# Patient Record
Sex: MALE | Race: WHITE | NOT HISPANIC OR LATINO
[De-identification: names, ages, dates, MRNs, and addresses within clinical notes are randomized per-mention and may not be internally consistent; named-entity substitution may affect disease eponyms.]

---

## 2021-08-03 ENCOUNTER — APPOINTMENT (OUTPATIENT)
Dept: OPHTHALMOLOGY | Facility: CLINIC | Age: 66
End: 2021-08-03
Payer: MEDICARE

## 2021-08-03 ENCOUNTER — NON-APPOINTMENT (OUTPATIENT)
Age: 66
End: 2021-08-03

## 2021-08-03 PROCEDURE — 92014 COMPRE OPH EXAM EST PT 1/>: CPT

## 2021-09-14 ENCOUNTER — FORM ENCOUNTER (OUTPATIENT)
Age: 66
End: 2021-09-14

## 2021-09-15 ENCOUNTER — TRANSCRIPTION ENCOUNTER (OUTPATIENT)
Age: 66
End: 2021-09-15

## 2022-07-15 ENCOUNTER — NON-APPOINTMENT (OUTPATIENT)
Age: 67
End: 2022-07-15

## 2022-07-20 ENCOUNTER — APPOINTMENT (OUTPATIENT)
Dept: OPHTHALMOLOGY | Facility: CLINIC | Age: 67
End: 2022-07-20

## 2022-07-20 ENCOUNTER — NON-APPOINTMENT (OUTPATIENT)
Age: 67
End: 2022-07-20

## 2022-07-20 PROCEDURE — 92014 COMPRE OPH EXAM EST PT 1/>: CPT

## 2023-07-06 PROBLEM — Z00.00 ENCOUNTER FOR PREVENTIVE HEALTH EXAMINATION: Status: ACTIVE | Noted: 2023-07-06

## 2023-07-21 ENCOUNTER — APPOINTMENT (OUTPATIENT)
Dept: OPHTHALMOLOGY | Facility: CLINIC | Age: 68
End: 2023-07-21
Payer: MEDICARE

## 2023-07-21 ENCOUNTER — NON-APPOINTMENT (OUTPATIENT)
Age: 68
End: 2023-07-21

## 2023-07-21 PROCEDURE — 92014 COMPRE OPH EXAM EST PT 1/>: CPT

## 2023-07-24 ENCOUNTER — APPOINTMENT (OUTPATIENT)
Dept: ORTHOPEDIC SURGERY | Facility: CLINIC | Age: 68
End: 2023-07-24
Payer: MEDICARE

## 2023-07-24 VITALS — WEIGHT: 161 LBS | HEIGHT: 66 IN | BODY MASS INDEX: 25.88 KG/M2

## 2023-07-24 DIAGNOSIS — M48.02 SPINAL STENOSIS, CERVICAL REGION: ICD-10-CM

## 2023-07-24 DIAGNOSIS — E78.00 PURE HYPERCHOLESTEROLEMIA, UNSPECIFIED: ICD-10-CM

## 2023-07-24 DIAGNOSIS — Z78.9 OTHER SPECIFIED HEALTH STATUS: ICD-10-CM

## 2023-07-24 DIAGNOSIS — M47.812 SPONDYLOSIS W/OUT MYELOPATHY OR RADICULOPATHY, CERVICAL REGION: ICD-10-CM

## 2023-07-24 PROCEDURE — 99204 OFFICE O/P NEW MOD 45 MIN: CPT

## 2023-07-24 RX ORDER — FAMOTIDINE 10 MG/1
TABLET, FILM COATED ORAL
Refills: 0 | Status: ACTIVE | COMMUNITY

## 2023-07-24 RX ORDER — ATORVASTATIN CALCIUM 20 MG/1
20 TABLET, FILM COATED ORAL
Refills: 0 | Status: ACTIVE | COMMUNITY

## 2023-07-27 PROBLEM — M47.812 CERVICAL SPONDYLOSIS: Status: ACTIVE | Noted: 2023-07-27

## 2023-07-27 PROBLEM — M48.02 STENOSIS, CERVICAL SPINE: Status: ACTIVE | Noted: 2023-07-27

## 2023-07-27 NOTE — HISTORY OF PRESENT ILLNESS
[de-identified] : 07/24/2023 - Patient presents to the office for initial evaluation of neck pain and left hand tingling. He reports that he has been given a diagnosis of spinal stenosis approximately 10 years ago. Recently, he has been having more intermittent left hand tingling when doing yard work. He also reports that he has been dropping items with more frequency, and possibly some mild changes to his penmanship. Patient has a brother who had a history of spinal stenosis recently underwent emergent decompression. Specifically denies changes in his balance or gait. Denies bowel or bladder changes. \par \par  [FreeTextEntry5] : The patient is a 67 year old male who presents today complaining of cervical spine pain.\par Date of Injury/Onset:  ~1.5 month \par Pain:    At Rest: 0/10 \par With Activity:  2/10 discomfort\par Mechanism of injury:  Pt reports being dx'ed with spinal stenosis ~ 12 years ago, had and MRI at [facility] that reviewed spondylosis by PCP \par Quality of symptoms: Tingling in LT thumb, stiffness\par Improves with: Tylenol - some relief\par Worse with: Yard work\par Prior treatment:  PT @ [facility] - ongoing - getting less stiff\par Prior Imaging: MRI @ [facility] Jun/23\par Additional Information: None\par \par

## 2023-07-27 NOTE — PHYSICAL EXAM
[] : motor exam is non-focal throughout both upper extremities [de-identified] : Constitutional:\par - General Appearance:\par Unremarkable\par Body Habitus\par Well Developed\par Well Nourished\par Body Habitus\par No Deformities\par Well Groomed\par Ability To communicate:\par Normal\par Neurologic:\par Global sensation is intact to upper and lower extremities. See examination of Neck and/or Spine\par for exceptions.\par Orientation to Time, Place and Person is: Normal\par Mood And Affect is Normal\par Skin:\par - Head/Face, Right Upper/Lower Extremity, Left Upper/Lower Extremity: Normal\par See Examination of Neck and/or Spine for exceptions\par Cardiovascular:\par Peripheral Cardiovascular System is Normal\par Palpation of Lymph Nodes:\par Normal Palpation of lymph nodes in: Axilla, Cervical, Inguinal\par Abnormal Palpation of lymph nodes in: None  [de-identified] : Strength normal bilateral upper and lower extremities without focal deficit.  [de-identified] : Positive Marcy on the right. Reflect as equal and symmetrical.

## 2023-07-27 NOTE — ASSESSMENT
[FreeTextEntry1] : Cervical stenosis C4-7, without current myelopathic or radicular symptoms.\par \par Discussed with patient MRI findings and contribution to his symptoms. All questions were answered to his satisfaction. Discussed the role of decompressive surgery versus a conservative observational standpoint. At this time, the patient is having some intermittent symptoms, but he’s not floridly myelopathic or showing signs of progressive neurological change. \par \par Prior to appointment and during encounter with patient extensive medical records were reviewed including but not limited to, hospital records, outpatient records, imaging results, and lab data.During this appointment the patient was examined, diagnoses were discussed and explained in a face to face manner. In addition extensive time was spent reviewing aforementioned diagnostic studies. Counseling including abnormal image results, differential diagnoses, treatment options, risk and benefits, lifestyle changes, current condition, and current medications was performed. Patient's comments, questions, and concerns were addressed and patient verbalized understanding. Based on this patient's presentation at our office, which is an orthopedic spine surgeon's office, this patient inherently / intrinsically has a risk, however minute, of developing issues such as Cauda equina syndrome, bowel and bladder changes, or progression of motor or neurological deficits such as paralysis which may be permanent.

## 2023-07-27 NOTE — DATA REVIEWED
[FreeTextEntry1] : On my interpretation of these images\par MRI cervical spine, 6/23/23 - moderate to severe central spinal stenosis with bilateral foraminal stenosis C4/5 through C6/7. there is facet joint hypertrophy\par Cervical lordosis is maintained. No evidence of myelomalacia at this time.\par

## 2023-08-11 ENCOUNTER — APPOINTMENT (OUTPATIENT)
Dept: ORTHOPEDIC SURGERY | Facility: CLINIC | Age: 68
End: 2023-08-11

## 2023-09-07 ENCOUNTER — APPOINTMENT (OUTPATIENT)
Dept: ORTHOPEDIC SURGERY | Facility: CLINIC | Age: 68
End: 2023-09-07

## 2023-12-04 ENCOUNTER — APPOINTMENT (OUTPATIENT)
Dept: ORTHOPEDIC SURGERY | Facility: CLINIC | Age: 68
End: 2023-12-04

## 2023-12-11 ENCOUNTER — APPOINTMENT (OUTPATIENT)
Dept: ORTHOPEDIC SURGERY | Facility: CLINIC | Age: 68
End: 2023-12-11

## 2024-06-24 ENCOUNTER — APPOINTMENT (OUTPATIENT)
Dept: OPHTHALMOLOGY | Facility: CLINIC | Age: 69
End: 2024-06-24
Payer: MEDICARE

## 2024-06-24 ENCOUNTER — NON-APPOINTMENT (OUTPATIENT)
Age: 69
End: 2024-06-24

## 2024-06-24 PROCEDURE — 92014 COMPRE OPH EXAM EST PT 1/>: CPT

## 2024-08-14 ENCOUNTER — APPOINTMENT (OUTPATIENT)
Dept: NEUROLOGY | Facility: CLINIC | Age: 69
End: 2024-08-14
Payer: MEDICARE

## 2024-08-14 VITALS
DIASTOLIC BLOOD PRESSURE: 83 MMHG | BODY MASS INDEX: 27.64 KG/M2 | HEIGHT: 66 IN | OXYGEN SATURATION: 97 % | SYSTOLIC BLOOD PRESSURE: 143 MMHG | HEART RATE: 85 BPM | WEIGHT: 172 LBS

## 2024-08-14 DIAGNOSIS — G47.9 SLEEP DISORDER, UNSPECIFIED: ICD-10-CM

## 2024-08-14 DIAGNOSIS — E78.5 HYPERLIPIDEMIA, UNSPECIFIED: ICD-10-CM

## 2024-08-14 DIAGNOSIS — R26.81 UNSTEADINESS ON FEET: ICD-10-CM

## 2024-08-14 DIAGNOSIS — M48.00 SPINAL STENOSIS, SITE UNSPECIFIED: ICD-10-CM

## 2024-08-14 DIAGNOSIS — Z78.9 OTHER SPECIFIED HEALTH STATUS: ICD-10-CM

## 2024-08-14 DIAGNOSIS — Z82.0 FAMILY HISTORY OF EPILEPSY AND OTHER DISEASES OF THE NERVOUS SYSTEM: ICD-10-CM

## 2024-08-14 DIAGNOSIS — K21.9 GASTRO-ESOPHAGEAL REFLUX DISEASE W/OUT ESOPHAGITIS: ICD-10-CM

## 2024-08-14 PROCEDURE — 99205 OFFICE O/P NEW HI 60 MIN: CPT

## 2024-08-14 RX ORDER — OLOPATADINE HYDROCHLORIDE AND MOMETASONE FUROATE 25; 665 UG/1; UG/1
665-25 SPRAY, METERED NASAL
Refills: 0 | Status: ACTIVE | COMMUNITY

## 2024-08-14 NOTE — PHYSICAL EXAM
[FreeTextEntry1] : NEURO: Mental Status Oriented to person, place, time, and situation Speech is clear, fluent, and spontaneous. Comprehension and memory intact.   Cranial Nerves Visual fields full to confrontation Pupils equal, round, reactive to light. Extraocular movements intact. No nystagmus or ptosis. Facial sensation intact and symmetric in V1, V2, V3 Facial movement intact and symmetric Uvula midline, soft palate elevates normally Bilateral shoulder shrug intact Tongue midline, no tongue bite sign or deviation on protrusion  Neck ROM limited but no pain. No perispinal or spinal tenderness to palpation.   Motor No cogwheeling Shoulder abduction: 5/5 b/l Elbow flexion/extension: 5/5 bl Hand : 5/5 b/l Hip flexion/extension: 5/5 b/l Knee flexion/extension: 5/5 b/l Dorsiflexion/plantar flexion: 5/5 b/l No pronator drift   Sensation Light touch grossly intact   Deep Tendon Reflexes Biceps 2+ b/l Brachioradialis 2+ b/l Patellar 1+ b/l Ankle 1+ b/l   Coordination Normal finger to nose bilaterally No past pointing Able to perform rapid, alternating movements No dysdiachokinesia No tremor appreciated at rest.   Gait Normal stance, stride, and pivot turn Tandem walk intact Heel and toe gait intact. Negative Romberg.     GEN: awake, alert, interactive, no acute distress EYES: sclera white, conjunctiva clear, no redness or discharge ENT: normal appearing outer ears, hearing grossly intact PULM: normal respiratory rhythm and effort, speaking in full sentences without distress, no accessory muscle usage EXT: moving all extremities spontaneously, no edema, no cyanosis SKIN: warm, dry, no lesions on visualized skin
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
## 2024-08-14 NOTE — DATA REVIEWED
[de-identified] : 7/26/24 FINDINGS: Multiple patchy confluent nonspecific foci of T2/FLAIR hyperintensity are noted throughout the deep and periventricular white matter of the cerebral hemispheres. There is no associated mass effect. There is no evidence of acute ischemia on the diffusion-weighted images. Small scattered foci of susceptibility artifact are seen within the right parietotemporal lobes which may reflect areas of chronic microhemorrhage. There is diffuse cerebral volume loss with prominence of the sulci, fissures, and cisternal spaces which is normal for the patient's age. Ventricular size and configuration is unremarkable. Flow-voids are noted throughout the major intracranial vessels, on the T2 weighted images, consistent with their patency. The sellar region and posterior fossa appear unremarkable.  The paranasal sinuses and tympanomastoid cavities are clear. Calvarial signal is within normal limits. The orbits appear unremarkable. IMPRESSION: No acute intracranial hemorrhage or evidence of acute ischemia. Multiple patchy confluent nonspecific abnormal white matter foci of T2/FLAIR prolongation statistically favoring microvascular type changes. [de-identified] : - MRI [facility] 6/23/2023 FINDINGS: DISC LEVEL EVALUATION: Moderately degraded by motion artifact. C1/2: No central canal narrowing. C2/C3: No central canal or foraminal narrowing. C3/C4: Disc bulging. Left lateral recess/foraminal disc osteophyte complex. Small central disc protrusion mildly contacting the ventral spinal cord. Mild central canal narrowing. Severe left and moderate right foraminal narrowing. C4/C5: Moderate posterior osseous ridging with a left broad-based paracentral disc protrusion mildly flattening left ventral spinal cord. Facet and uncovertebral hypertrophy. Moderate central canal narrowing. Moderate to severe bilateral foraminal narrowing is worse on the left. C5/C6: Posterior osseous ridging with disc bulging flattening the ventral spinal cord. Facet and uncovertebral hypertrophy. Severe left and moderate to severe right foraminal narrowing. Moderate to severe central canal stenosis. C6/C7: Posterior osseous ridging. Left lateral recess to foraminal disc osteophyte complex. Right foraminal disc osteophyte complex. Moderate to severe bilateral foraminal narrowing. Mild to moderate central canal narrowing. C7/T1: Facet degenerative change. Mild anterolisthesis and flattening the ventral thecal sac. No central canal or foraminal narrowing. Visualized upper thoracic spine: Evaluated only in the sagittal plane. No central canal or significant foraminal narrowing. ALIGNMENT: Straightening of the cervical spine. CORD: Limited by motion artifact. No focal area of T2 signal prolongation noted. MARROW: No fracture or reactive bone marrow edema. IMAGED BRAIN: Cervicomedullary junction is intact PERIPHERAL/NECK SOFT TISSUES: Symmetric appearance of the paraspinal musculature. IMPRESSION: Limited by motion artifact. Moderate to severe multilevel spondylosis as described above
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
## 2025-06-17 ENCOUNTER — APPOINTMENT (OUTPATIENT)
Dept: COLORECTAL SURGERY | Facility: CLINIC | Age: 70
End: 2025-06-17
Payer: MEDICARE

## 2025-06-17 PROCEDURE — 46221 LIGATION OF HEMORRHOID(S): CPT

## 2025-06-17 PROCEDURE — 99203 OFFICE O/P NEW LOW 30 MIN: CPT | Mod: 25

## 2025-06-24 ENCOUNTER — NON-APPOINTMENT (OUTPATIENT)
Age: 70
End: 2025-06-24

## 2025-06-24 ENCOUNTER — APPOINTMENT (OUTPATIENT)
Dept: OPHTHALMOLOGY | Facility: CLINIC | Age: 70
End: 2025-06-24
Payer: MEDICARE

## 2025-06-24 PROCEDURE — 92014 COMPRE OPH EXAM EST PT 1/>: CPT

## 2025-07-19 ENCOUNTER — NON-APPOINTMENT (OUTPATIENT)
Age: 70
End: 2025-07-19

## 2025-07-22 ENCOUNTER — APPOINTMENT (OUTPATIENT)
Dept: COLORECTAL SURGERY | Facility: CLINIC | Age: 70
End: 2025-07-22
Payer: MEDICARE

## 2025-07-22 VITALS
WEIGHT: 170 LBS | HEIGHT: 66 IN | BODY MASS INDEX: 27.32 KG/M2 | SYSTOLIC BLOOD PRESSURE: 114 MMHG | HEART RATE: 86 BPM | DIASTOLIC BLOOD PRESSURE: 72 MMHG | RESPIRATION RATE: 16 BRPM

## 2025-07-22 DIAGNOSIS — K64.8 OTHER HEMORRHOIDS: ICD-10-CM

## 2025-07-22 PROCEDURE — 99214 OFFICE O/P EST MOD 30 MIN: CPT | Mod: 25

## 2025-07-22 PROCEDURE — 46221 LIGATION OF HEMORRHOID(S): CPT

## 2025-09-16 ENCOUNTER — APPOINTMENT (OUTPATIENT)
Dept: COLORECTAL SURGERY | Facility: CLINIC | Age: 70
End: 2025-09-16
Payer: MEDICARE

## 2025-09-16 DIAGNOSIS — K64.8 OTHER HEMORRHOIDS: ICD-10-CM

## 2025-09-16 PROCEDURE — 46221 LIGATION OF HEMORRHOID(S): CPT

## 2025-09-16 PROCEDURE — 99214 OFFICE O/P EST MOD 30 MIN: CPT | Mod: 25
